# Patient Record
Sex: MALE | Race: WHITE | Employment: FULL TIME | URBAN - METROPOLITAN AREA
[De-identification: names, ages, dates, MRNs, and addresses within clinical notes are randomized per-mention and may not be internally consistent; named-entity substitution may affect disease eponyms.]

---

## 2018-08-09 ENCOUNTER — OFFICE VISIT (OUTPATIENT)
Dept: INTERNAL MEDICINE CLINIC | Age: 38
End: 2018-08-09

## 2018-08-09 VITALS
WEIGHT: 256 LBS | BODY MASS INDEX: 34.67 KG/M2 | TEMPERATURE: 98.5 F | DIASTOLIC BLOOD PRESSURE: 70 MMHG | RESPIRATION RATE: 18 BRPM | HEART RATE: 78 BPM | SYSTOLIC BLOOD PRESSURE: 109 MMHG | HEIGHT: 72 IN | OXYGEN SATURATION: 96 %

## 2018-08-09 DIAGNOSIS — K75.0 LIVER ABSCESS: Primary | ICD-10-CM

## 2018-08-09 DIAGNOSIS — N17.9 AKI (ACUTE KIDNEY INJURY) (HCC): ICD-10-CM

## 2018-08-09 DIAGNOSIS — I82.621 ACUTE DEEP VEIN THROMBOSIS (DVT) OF RIGHT UPPER EXTREMITY, UNSPECIFIED VEIN (HCC): ICD-10-CM

## 2018-08-09 NOTE — MR AVS SNAPSHOT
[facility] 
 
 
 [address] [facility] [address] 
[phone] Patient: Jovanna Dill MRN: [id number] PGR:[id number] Visit Information Date & Time Provider Department Dept. Phone Encounter #  
 8/9/2018  1:30 PM Miguel Plasencia MD [address] [phone] [id number] Follow-up Instructions Return in about 2 weeks (around 8/23/2018) for f/u liver abscess. Upcoming Health Maintenance Date Due DTaP/Tdap/Td series (1 - Tdap) 9/1/2018* Influenza Age 5 to Adult 9/1/2018* *Topic was postponed. The date shown is not the original due date. Allergies as of 8/9/2018  Review Complete On: 8/9/2018 By: Miguel Plasencia MD  
 No Known Allergies Current Immunizations  Never Reviewed No immunizations on file. Not reviewed this visit You Were Diagnosed With   
  
 Codes Comments Liver abscess    -  Primary ICD-10-CM: K75.0 ICD-9-CM: 572.0 Vitals BP Pulse Temp Resp Height(growth percentile) Weight(growth percentile) 109/70 (BP 1 Location: Left arm, BP Patient Position: Sitting) 78 98.5 °F (36.9 °C) (Oral) 18 6' (1.829 m) 256 lb (116.1 kg) SpO2 BMI Smoking Status 96% 34.72 kg/m2 Never Smoker Vitals History BMI and BSA Data Body Mass Index Body Surface Area 34.72 kg/m 2 2.43 m 2 Preferred Pharmacy Pharmacy Name Phone [id number] [phone] [phone] Your Updated Medication List  
  
   
This list is accurate as of 8/9/18  3:48 PM.  Always use your most recent med list.  
  
  
  
  
 PIPERACILLIN-TAZOBACTAM IV  
3.375 g by IntraVENous route every six (6) hours. Indications: 135gm in 200ml NS @47.6ml/hr. 3.375Gm Q6H IV via PICC XARELTO PO Take 30 mg by mouth two (2) times a day. We Performed the Following REFERRAL TO INFECTIOUS DISEASE [REF37 Custom] Comments: Please evaluate for liver abscess ASAP. Dr. America Loya [phone] REFERRAL TO INTERVENTIONAL RADIOLOGY [RJF05 Custom] Comments:  
 Pt needs ASAP appt. For removal of drain in RUQ area that was placed by IR at [facility] and no longer draining. Follow-up Instructions Return in about 2 weeks (around 8/23/2018) for f/u liver abscess. Referral Information Referral ID Referred By Referred To  
  
 [id number] Jenny LANDEROS Not Available Visits Status Start Date End Date 1 New Request 8/9/18 8/9/19 If your referral has a status of pending review or denied, additional information will be sent to support the outcome of this decision. Referral ID Referred By Referred To  
 [id number] Jenny LANDEROS Not Available Visits Status Start Date End Date 1 New Request 8/9/18 8/9/19 If your referral has a status of pending review or denied, additional information will be sent to support the outcome of this decision. Patient Instructions 1) follow-up in 2 weeks or sooner if worsening symptoms. 2) ID Dr. Henrik Ferris: [phone] 3) [facility] [phone] Introducing [facility]! [facility] introduces [employer] patient portal. Now you can access parts of your medical record, email your doctor's office, and request medication refills online. 1. In your internet browser, go to [email or web address] 2. Click on the First Time User? Click Here link in the Sign In box. You will see the New Member Sign Up page. 3. Enter your [employer] Access Code exactly as it appears below. You will not need to use this code after youve completed the sign-up process. If you do not sign up before the expiration date, you must request a new code. · [employer] Access Code: [email or web address] Expires: 11/7/2018  2:41 PM 
 
4.  Enter the last four digits of your Social Security Number (xxxx) and Date of Birth (mm/dd/yyyy) as indicated and click Submit. You will be taken to the next sign-up page. 5. Create a AudienceView ID. This will be your [employer] login ID and cannot be changed, so think of one that is secure and easy to remember. 6. Create a [employer] password. You can change your password at any time. 7. Enter your Password Reset Question and Answer. This can be used at a later time if you forget your password. 8. Enter your e-mail address. You will receive e-mail notification when new information is available in [address] 9. Click Sign Up. You can now view and download portions of your medical record. 10. Click the Download Summary menu link to download a portable copy of your medical information. If you have questions, please visit the Frequently Asked Questions section of the [employer] website. Remember, [employer] is NOT to be used for urgent needs. For medical emergencies, dial 911. Now available from your iPhone and Android! Please provide this summary of care documentation to your next provider. Your primary care clinician is listed as Latisha Smith. If you have any questions after today's visit, please call [phone].

## 2018-08-09 NOTE — PROGRESS NOTES
Chief Complaint   Patient presents with   [facility] Follow Up     [facility]- per pt. he had a parasite that caused a cyst on his liver, sepsis. now with drain from right side. No f/u specialist per pt. HPI:     Amanda Casanova is a 45 y.o.  male with history of gout and kidney stone here for he above complaint. Pt was in [place] and not feeling well. He drove back here and went to [facility]. He was aditted from 7/24/18-7/30/18 for liver abscess and DVT and sepsis. It was up top 5.2cm and drain placed and picc line as well. Think abscess from possible parasite. He was sent home on zosyn 3.375mg every 6 hrs and was suppose to follow up with ID. However, pt was not made aware of such follow-up. He has a [address] that comes on Mondays and she does dressing changes to picc line which is on the right upper arm. He did develop DVT and was put on xarelto for a total of 3 months. He was also found to have PATRICK, but recent lab work on 8/6/18 showed kidney function back to normal and BMP/CBC normal. He denies any chest pain, shortness of breath, abdominal pain, headaches, dizziness fevers, chills, night sweats, drainage from the picc site or PAOLA drain. Hospital records were reviewed. IN reviewing the notes, it looks like IR was the one who placed the drain in the RUQ area. They were going to take it out, but then decided to keep it in, in addition to the PICC line. He is not sure when he is suppose to have drain taken out and notes indicate needs to f/u with ID. Abx: He is suppose to be on the zosyn till 8/22/18 and then another 2 weeks of oral antibiotics.                Past Medical History:   Diagnosis Date    Acute kidney injury ([address] 07/30/2018    Calculus of kidney 2015    more than once    Gout 2010    Hepatic abscess 07/24/2018    right anterior hepatic lobe abscess c internal enhancing septations & debris    Sepsis ([address] 07/24/2018    r/t parasitic infx on liver    Splenomegaly 07/24/2018    r/t parasitic infx and sepsis    Thromboembolus ([address] 07/24/2018    RUE and LLE in 1997       Past Surgical History:   Procedure Laterality Date    HX PACEMAKER  08/09/2018    RUE PICC           MEDICATION ALLERGIES/INTOLERANCES:   No Known Allergies          CURRENT MEDICATIONS:    Current Outpatient Prescriptions   Medication Sig    piperacillin sodium/tazobactam (PIPERACILLIN-TAZOBACTAM IV) 3.375 g by IntraVENous route every six (6) hours. Indications: 135gm in 200ml NS @47.6ml/hr. 3.375Gm Q6H IV via PICC    rivaroxaban (XARELTO PO) Take 30 mg by mouth two (2) times a day. No current facility-administered medications for this visit. Health Maintenance   Topic Date Due    DTaP/Tdap/Td series (1 - Tdap) 09/01/2018 (Originally 7/22/2001)    Influenza Age 5 to Adult  09/01/2018 (Originally 8/1/2018)         FAMILY HISTORY:   Family History   Problem Relation Age of Onset    No Known Problems Mother     Liver Disease Father     Cancer Father     No Known Problems Sister     No Known Problems Brother        SOCIAL HISTORY:   He  reports that he has never smoked. He quit smokeless tobacco use about 5 months ago. He  reports that he drinks alcohol.           ROS:   General: negative for - chills, fatigue, fever, weight loss or weight gain, night sweats  HEENT: negative for - no sore throat, nasal congestion, vision problems or ear problems  Resp: negative for - cough, shortness of breath or wheezing  CV: negative for - chest pain, palpitations, orthopnea or PND,  GI: negative for - abdominal pain, change in bowel habits, constipation, diarrhea, blood or black tarry stools, or nausea/vomiting  : negative for - dysuria, hematuria, increase urgency and frequency, nocturia  Heme: negative for -excessive bleeding or bruising  Endo: negative for - polydipsia/polyuria or hot or cold intolerance  MSK: negative for - joint pain, joint swelling or muscle pain  Neuro: negative for - numbness, tingling, headache or dizziness  Derm: negative for - dry skin, hair changes, rash or skin lesion changes  Psych: negative for - anxiety, depression, irritability or mood swings, insomnia    OBJECTIVE:  PHYSICAL EXAM: Vitals:   Vitals:    08/09/18 1337   BP: 109/70   Pulse: 78   Resp: 18   Temp: 98.5 °F (36.9 °C)   TempSrc: Oral   SpO2: 96%   Weight: 256 lb (116.1 kg)   Height: 6' (1.829 m)     Exam:   Generally: Pleasant male in no acute distress    HEENT exam: Head: atraumatic     Eyes: Pupils equally round and reactive to light; fundoscopic exam is                         normal               Ears: bilaterally normal tympanic membrane, no erythema or exudate,                         normal light reflex               Mouth: clear, no erythema or exudate    Nares: moist mucosa/no erythema     Neck: supple, no lymphadenopathy, negative thyromegaly, negative                         carotid bruits bilaterally    Cardiac exam: regular, rate, and rhythm. No murmurs, gallops, or rubs. Normal S1 and S2.    Pulmonary exam: Clear to ausculation bilaterally    Abdominal exam: Positive bowel sounds in all four quadrants. Soft. Nondistended. Nontender. No hepatosplenomegaly. Extremities: 2+ dorsalis pedis bilaterally. No pedal edema bilaterally. Right upper arm: PICC line in right upper arm. Dressing: c/d/i, no TTP or purulent drainage. RUQ area: PAOLA drain, not draining. Some serosanguinos fluid in drain that is old. No signs of infection. Just little erythema around site of drain. No purulent drainage or TTP       LABS/RADIOLOGICAL TESTS:     none        ASSESSMENT/PLAN:      ICD-10-CM ICD-9-CM    1. Liver abscess K75.0 572.0 STAT referrals to ID and IR. Continue the zosyn 3.375mg one po q6 hrs. He is to finish this on 8/22/18. REFERRAL TO INFECTIOUS DISEASE      REFERRAL TO INTERVENTIONAL RADIOLOGY   2. Acute deep vein thrombosis (DVT) of right upper extremity, unspecified vein (HCC) I82.621 453.82 Stable. Continue the xarelto. Started on 7/30/18 and needs to be on for 3 months. 3. PATRICK (acute kidney injury) ([facility] [address] N17.9 584.9 Resolved. 4. Patient verbalized understanding and agreement with the plan. 5. Patient was given an after visit summary. 6.   Follow-up Disposition:  Return in about 2 weeks (around 8/23/2018) for f/u liver abscess. or sooner if worsening symptoms.           Jostin Hoff MD

## 2018-08-09 NOTE — PATIENT INSTRUCTIONS
1) follow-up in 2 weeks or sooner if worsening symptoms.      2) ID Dr. Tata Lyle: [phone]    3) [facility] ([phone]

## 2018-08-09 NOTE — PROGRESS NOTES
ROOM # 3    Alfonzo Celestin presents today for   Chief Complaint   Patient presents with   [address] Follow Up     [facility]- per pt. he had a parasite that caused a cyst on his liver, sepsis. now with drain from right side. No f/u specialist per pt. Alfonzo Celestin preferred language for health care discussion is english/other. Is someone accompanying this pt? no    Is the patient using any DME equipment during [address]? Yes infusion pump for IV atibx via RUE PICC    Depression Screening:  PHQ over the last two weeks 8/9/2018 8/9/2018   Little interest or pleasure in doing things Not at all Not at all   Feeling down, depressed, irritable, or hopeless Not at all Not at all   Total Score PHQ 2 0 0       Learning Assessment:  Learning Assessment 8/9/2018   PRIMARY LEARNER Patient   HIGHEST LEVEL OF EDUCATION - PRIMARY LEARNER  SOME COLLEGE   BARRIERS PRIMARY LEARNER NONE   CO-LEARNER CAREGIVER No   PRIMARY LANGUAGE ENGLISH   LEARNER PREFERENCE PRIMARY LISTENING   ANSWERED BY patient   RELATIONSHIP SELF       Abuse Screening:  Abuse Screening Questionnaire 8/9/2018   Do you ever feel afraid of your partner? N   Are you in a relationship with someone who physically or mentally threatens you? N   Is it safe for you to go home? Y       Fall Risk  No flowsheet data found. Health Maintenance reviewed and discussed per provider. Yes    Alfonzo Celestin is due for   There are no preventive care reminders to display for this patient. Please order/place referral if appropriate. Advance Directive:  1. Do you have an advance directive in place? Patient Reply: no    2. If not, would you like material regarding how to put one in place? Patient Reply: no    Coordination of Care:  1. Have you been to the ER, urgent care clinic since your last visit? Hospitalized since your last visit? no    2. Have you seen or consulted any other health care providers outside of the [address] [place] [address] since your last visit? Include any pap smears or colon screening.  no

## 2018-08-16 ENCOUNTER — TELEPHONE (OUTPATIENT)
Dept: INTERNAL MEDICINE CLINIC | Age: 38
End: 2018-08-16

## 2018-08-16 NOTE — TELEPHONE ENCOUNTER
2 pt. Identifiers confirmed. NP advised tat per pt. Drain was not putting out anything [facility] only [facility] his PICC dsg. Per NP their OP IR can schedule pt. To come in to be evaluated for drain removal. Either the provider can order a CT c contrast or they can inject the dye at the time of his aptmt. As long as their is not other involvement ie bowel, then they should be able to remove it based on IM notes from his previous admission. Will call OP IR scheduling at [phone]. Pt. Notified of below and he was given number to OP IR. He will try to call in the AM and if he has any trouble scheduling (may have to be done by the office) he will call [phone] on Monday so we can f/u on this. No other questions/concerns at this time.

## 2018-08-20 NOTE — TELEPHONE ENCOUNTER
2 pt. Identifiers confirmed. S/W OP IR to have pt. Scheduled for evaluation for drain removal. They were notified of ROYA Mariee's recommendation for a dye study. Scheduling would like to check c NP Strider to confirm plan/order before scheduling the pt. Will await return call from scheduling. Calm/Appropriate

## 2018-08-20 NOTE — TELEPHONE ENCOUNTER
Called Mica at [facility] central scheduling and she said pt needs order for IR drainage tube evaluation and check. Could not find in [facility]. She said to write on rx pad and fax to her at [phone]. Order faxed to facility.

## 2018-08-20 NOTE — TELEPHONE ENCOUNTER
Order faxed to facility, confirmation received. Pt. Notified of below. He states that he was able to get a sooner aptmt for tomorrow @0900 c ID. He will notify them of his referral to be scheduled c IR for eval/drain removal. Per DR. Smith, pt. To f/u in 1 month from now. No other questions/concerns at this time.

## 2018-08-21 ENCOUNTER — TELEPHONE (OUTPATIENT)
Dept: INTERNAL MEDICINE CLINIC | Age: 38
End: 2018-08-21

## 2018-08-21 NOTE — TELEPHONE ENCOUNTER
Interventional Radiology calling to speak with [facility] AND [facility] regarding orders for patient. IR states they have conflicting orders from two different providers and need clarification.

## 2018-08-21 NOTE — TELEPHONE ENCOUNTER
S/W  who states that there are 2 different orders, one from Dr. Sherif Shearer and another from Dr. Anna Noriega. Agreed to moved forward c Dr. Ralph Mccann for eval/removal scheduled this Thursday. DISPLAY PLAN FREE TEXT

## 2018-08-21 NOTE — TELEPHONE ENCOUNTER
I talked to Elba Thurman (IR scheduling) yesterday and she told me to write on rx pad and fax to them:IR drainage tube evaluation and check. This was faxed to them yesterday.